# Patient Record
Sex: MALE | Race: WHITE
[De-identification: names, ages, dates, MRNs, and addresses within clinical notes are randomized per-mention and may not be internally consistent; named-entity substitution may affect disease eponyms.]

---

## 2022-12-05 ENCOUNTER — HOSPITAL ENCOUNTER (EMERGENCY)
Dept: HOSPITAL 4 - SED | Age: 37
Discharge: HOME | End: 2022-12-05
Payer: SELF-PAY

## 2022-12-05 VITALS — WEIGHT: 160 LBS | BODY MASS INDEX: 24.25 KG/M2 | HEIGHT: 68 IN

## 2022-12-05 VITALS — SYSTOLIC BLOOD PRESSURE: 106 MMHG

## 2022-12-05 DIAGNOSIS — Z79.899: ICD-10-CM

## 2022-12-05 DIAGNOSIS — G40.909: Primary | ICD-10-CM

## 2022-12-05 NOTE — NUR
Patient given written and verbal discharge instructions and verbalizes 
understanding.  ER MD discussed with patient the results and treatment 
provided. Patient in stable condition. ID arm band removed. 

Rx of KEPPRA  given. Patient educated on pain management and to follow up with 
PMD. Pain Scale 0.

Opportunity for questions provided and answered. Medication side effect fact 
sheet provided.ARC EN ROUTE FOR PICKUP.

## 2022-12-05 NOTE — NUR
Attempted to notify [facility] at number provided for return 
transport. [phone], Ext [phone]. Message left for return call.